# Patient Record
Sex: MALE | Race: WHITE
[De-identification: names, ages, dates, MRNs, and addresses within clinical notes are randomized per-mention and may not be internally consistent; named-entity substitution may affect disease eponyms.]

---

## 2017-06-08 ENCOUNTER — HOSPITAL ENCOUNTER (OUTPATIENT)
Dept: HOSPITAL 39 - GMAH | Age: 67
Discharge: HOME | End: 2017-06-08
Attending: FAMILY MEDICINE
Payer: COMMERCIAL

## 2017-06-08 DIAGNOSIS — Z12.5: Primary | ICD-10-CM

## 2017-06-08 DIAGNOSIS — E78.2: ICD-10-CM

## 2017-06-08 DIAGNOSIS — I10: ICD-10-CM

## 2017-06-08 PROCEDURE — 84443 ASSAY THYROID STIM HORMONE: CPT

## 2017-06-08 PROCEDURE — 84550 ASSAY OF BLOOD/URIC ACID: CPT

## 2017-12-14 ENCOUNTER — HOSPITAL ENCOUNTER (OUTPATIENT)
Dept: HOSPITAL 39 - GMAH | Age: 67
Discharge: HOME | End: 2017-12-14
Attending: FAMILY MEDICINE
Payer: COMMERCIAL

## 2017-12-14 DIAGNOSIS — R97.20: Primary | ICD-10-CM

## 2018-06-26 ENCOUNTER — HOSPITAL ENCOUNTER (OUTPATIENT)
Dept: HOSPITAL 39 - GMAH | Age: 68
End: 2018-06-26
Attending: FAMILY MEDICINE
Payer: COMMERCIAL

## 2018-06-26 DIAGNOSIS — E78.2: ICD-10-CM

## 2018-06-26 DIAGNOSIS — Z12.5: ICD-10-CM

## 2018-06-26 DIAGNOSIS — E74.39: ICD-10-CM

## 2018-06-26 DIAGNOSIS — I10: Primary | ICD-10-CM

## 2018-06-26 PROCEDURE — 84550 ASSAY OF BLOOD/URIC ACID: CPT

## 2018-06-26 PROCEDURE — 84443 ASSAY THYROID STIM HORMONE: CPT

## 2018-12-20 ENCOUNTER — HOSPITAL ENCOUNTER (OUTPATIENT)
Dept: HOSPITAL 39 - GMAH | Age: 68
End: 2018-12-20
Attending: FAMILY MEDICINE
Payer: COMMERCIAL

## 2018-12-20 DIAGNOSIS — R97.20: Primary | ICD-10-CM

## 2019-07-02 ENCOUNTER — HOSPITAL ENCOUNTER (OUTPATIENT)
Dept: HOSPITAL 39 - GMAH | Age: 69
End: 2019-07-02
Attending: FAMILY MEDICINE
Payer: COMMERCIAL

## 2019-07-02 DIAGNOSIS — E78.2: ICD-10-CM

## 2019-07-02 DIAGNOSIS — Z12.5: ICD-10-CM

## 2019-07-02 DIAGNOSIS — I10: Primary | ICD-10-CM

## 2019-07-15 ENCOUNTER — HOSPITAL ENCOUNTER (OUTPATIENT)
Dept: HOSPITAL 39 - US | Age: 69
End: 2019-07-15
Attending: FAMILY MEDICINE
Payer: COMMERCIAL

## 2019-07-15 DIAGNOSIS — N64.89: Primary | ICD-10-CM

## 2019-07-15 NOTE — MAM
EXAM DESCRIPTION: 

3D Diagnostic, Bilateral (accession [id number]),

Breast,Bilateral (accession [id number]): Ultrasound



CLINICAL HISTORY: 

68 yearsMaleBreast lump noticed lump in some tenderness behind

the right nipple while traveling in another country. No personal

or family history of breast cancer. No history of trauma.

Medication change for high blood pressure after noticing the

breast lump .



COMPARISON: 

None.



TECHNIQUE: 

Bilateral LM, CC, and MLO projection full-field images,  digital

mammographic tomosynthesis technique.  Bilateral 2-D digital

full-field MLO images.  CAD not available .  Transcutaneous

scanning of the bilateral breasts utilizing gray-scale and

Doppler modes. Scanning performed by the sonographer and Dr. Arias.



FINDINGS: 

The breast parenchymal density pattern is: Almost entirely fatty.

No skin thickening or nipple retraction  asymmetric increased

density of tissue in the retroareolar right breast compared to

the left. No abnormal calcifications. 



Ultrasound: Decreased echoes in the right breast retroareolar

tissues compared to the left. No dominant soft tissue mass. No

distinct cyst. No large calcifications. No parenchymal edema or

abnormal vascularity.



IMPRESSION: 

BI-RADS CATEGORY: 3 - PROBABLY BENIGN.

Management: Short interval (6-month) follow-up right breast

diagnostic digital mammography and targeted right breast

ultrasound, or continued clinical surveillance.



The FINDINGS and the FOLLOW-UP plan were reviewed in person with

the patient after the examination. Written communication

explaining the IMPRESSION and FOLLOW-UP will be mailed to the

patient and referring care provider.



Electronically signed by:  Romain Arias MD  7/15/2019 2:19 PM CDT

Workstation: [id number]

## 2019-07-15 NOTE — US
EXAM DESCRIPTION: 

3D Diagnostic, Bilateral (accession [id number]),

Breast,Bilateral (accession [id number]): Ultrasound



CLINICAL HISTORY: 

68 yearsMaleBreast lump noticed lump in some tenderness behind

the right nipple while traveling in another country. No personal

or family history of breast cancer. No history of trauma.

Medication change for high blood pressure after noticing the

breast lump .



COMPARISON: 

None.



TECHNIQUE: 

Bilateral LM, CC, and MLO projection full-field images,  digital

mammographic tomosynthesis technique.  Bilateral 2-D digital

full-field MLO images.  CAD not available .  Transcutaneous

scanning of the bilateral breasts utilizing gray-scale and

Doppler modes. Scanning performed by the sonographer and Dr. Arias.



FINDINGS: 

The breast parenchymal density pattern is: Almost entirely fatty.

No skin thickening or nipple retraction  asymmetric increased

density of tissue in the retroareolar right breast compared to

the left. No abnormal calcifications. 



Ultrasound: Decreased echoes in the right breast retroareolar

tissues compared to the left. No dominant soft tissue mass. No

distinct cyst. No large calcifications. No parenchymal edema or

abnormal vascularity.



IMPRESSION: 

BI-RADS CATEGORY: 3 - PROBABLY BENIGN.

Management: Short interval (6-month) follow-up right breast

diagnostic digital mammography and targeted right breast

ultrasound, or continued clinical surveillance.



The FINDINGS and the FOLLOW-UP plan were reviewed in person with

the patient after the examination. Written communication

explaining the IMPRESSION and FOLLOW-UP will be mailed to the

patient and referring care provider.



Electronically signed by:  Romain Arias MD  7/15/2019 2:19 PM CDT

Workstation: [id number]

## 2019-10-04 ENCOUNTER — HOSPITAL ENCOUNTER (OUTPATIENT)
Dept: HOSPITAL 39 - GMA MATASK | Age: 69
End: 2019-10-04
Attending: FAMILY MEDICINE
Payer: COMMERCIAL

## 2019-10-04 DIAGNOSIS — I10: Primary | ICD-10-CM

## 2021-02-23 ENCOUNTER — HOSPITAL ENCOUNTER (OUTPATIENT)
Dept: HOSPITAL 39 - GMAM | Age: 71
End: 2021-02-23
Attending: FAMILY MEDICINE
Payer: MEDICARE

## 2021-02-23 DIAGNOSIS — E78.2: ICD-10-CM

## 2021-02-23 DIAGNOSIS — Z12.5: Primary | ICD-10-CM

## 2021-02-23 PROCEDURE — 84550 ASSAY OF BLOOD/URIC ACID: CPT

## 2021-02-23 PROCEDURE — 84443 ASSAY THYROID STIM HORMONE: CPT
